# Patient Record
Sex: MALE | Race: WHITE | NOT HISPANIC OR LATINO | Employment: OTHER
[De-identification: names, ages, dates, MRNs, and addresses within clinical notes are randomized per-mention and may not be internally consistent; named-entity substitution may affect disease eponyms.]

---

## 2024-06-17 ENCOUNTER — TRANSCRIBE ORDERS (OUTPATIENT)
Dept: OTHER | Age: 74
End: 2024-06-17

## 2024-06-17 DIAGNOSIS — R10.13 EPIGASTRIC PAIN: ICD-10-CM

## 2024-06-17 DIAGNOSIS — Z71.3 DIETARY COUNSELING AND SURVEILLANCE: ICD-10-CM

## 2024-06-17 DIAGNOSIS — K21.9 GASTROESOPHAGEAL REFLUX DISEASE WITHOUT ESOPHAGITIS: Primary | ICD-10-CM

## 2024-06-17 DIAGNOSIS — R13.10 DYSPHAGIA, UNSPECIFIED TYPE: ICD-10-CM

## 2024-06-17 DIAGNOSIS — I10 ESSENTIAL (PRIMARY) HYPERTENSION: ICD-10-CM

## 2024-06-18 ENCOUNTER — HOSPITAL ENCOUNTER (OUTPATIENT)
Dept: SPEECH THERAPY | Facility: CLINIC | Age: 74
Discharge: HOME | End: 2024-06-18
Attending: INTERNAL MEDICINE
Payer: MEDICARE

## 2024-06-18 ENCOUNTER — HOSPITAL ENCOUNTER (OUTPATIENT)
Dept: GENERAL RADIOLOGY | Facility: CLINIC | Age: 74
Discharge: HOME | End: 2024-06-18
Attending: INTERNAL MEDICINE
Payer: MEDICARE

## 2024-06-18 DIAGNOSIS — R13.10 DYSPHAGIA, UNSPECIFIED TYPE: ICD-10-CM

## 2024-06-18 DIAGNOSIS — R10.13 EPIGASTRIC PAIN: ICD-10-CM

## 2024-06-18 DIAGNOSIS — Z71.3 DIETARY COUNSELING AND SURVEILLANCE: ICD-10-CM

## 2024-06-18 DIAGNOSIS — K21.9 GASTROESOPHAGEAL REFLUX DISEASE WITHOUT ESOPHAGITIS: ICD-10-CM

## 2024-06-18 DIAGNOSIS — I10 ESSENTIAL (PRIMARY) HYPERTENSION: ICD-10-CM

## 2024-06-18 DIAGNOSIS — I10 PRIMARY HYPERTENSION: ICD-10-CM

## 2024-06-18 PROCEDURE — 92611 MOTION FLUOROSCOPY/SWALLOW: CPT | Mod: GN | Performed by: SPEECH-LANGUAGE PATHOLOGIST

## 2024-06-18 PROCEDURE — 74230 X-RAY XM SWLNG FUNCJ C+: CPT

## 2024-06-18 RX ORDER — BARIUM SULFATE 400 MG/ML
SUSPENSION ORAL ONCE
Status: COMPLETED | OUTPATIENT
Start: 2024-06-18 | End: 2024-06-18

## 2024-06-18 RX ADMIN — BARIUM SULFATE 10 ML: 400 SUSPENSION ORAL at 14:09

## 2024-06-18 NOTE — PROGRESS NOTES
"    SPEECH LANGUAGE PATHOLOGY VIDEOFLUOROSCOPIC SWALLOW STUDY (VFSS) REPORT      Subjective   Presenting condition or subjective complaint:  Pt reports an ongoing hx of difficulty swallowing for approximately 7-8 years.  Pt's symptoms have included heartburn, globus sensation, coughing on water going down the \"wrong throat\", and coughing on food such as crackers at times with material coming back up his nose.  Pt has a hx of GERD, hiatal hernia, and esophageal dilation.  He recently has had increased hoarse voicing and phlegm over the past 6 weeks.  His esophageal symptoms have increased over the past few months.  No hx of lung congestion/pneumonia.  Date of onset:   6/18/24  Prior diagnostic imaging/testing results:     EGD recently with no need for dilation.  EGD approximately 1 year ago with dilation of the esophagus completed per pt report.  An esophagram is planned later this week.      Objective   Current Diet/Method of Nutritional Intake: thin liquids (level 0), regular diet      VIDEOFLUOROSCOPIC SWALLOW STUDY  Radiologist: Dr. Leone  Views Taken: left lateral AP not completed given no significant pharyngeal residue at the end of today's study.  Physical location of procedure: [facility]    VFSS textures trialed:   VFSS Eval: Thin Liquids  Mode of Presentation: cup, straw   Order of Presentation: 1, 2, 3, 4  Preparatory Phase: WFL  Oral Phase: residue in oral cavity  Bolus Location When Swallow Initiated: posterior angle of ramus  Pharyngeal Phase: impaired hyolaryngel excursion, impaired epiglottic movement, impaired pharyngoesophageal segment opening  Rosenbeck's Penetration Aspiration Scale: 1 - no aspiration, contrast does not enter airway  Diagnostic Statement:  Decreased complete epiglottic closure/inversion, decreased pharyngeal space for inversion, suspect osteophytes may be impacting pharyngeal space and epiglottic movement, no confirmed penetration, trace residue at times, tight UES/slight bump at UES, " bolus was noted behind the soft palate at times, reflux of pharyngeal residue up and behind soft palate during trial 4 by straw with consecutive swallows, residue did not reach the nasal cavity    VFSS Eval: Mildly Thick Liquids  Mode of Presentation: cup   Order of Presentation: 5  Preparatory Phase: WFL  Oral Phase: residue in oral cavity - trace  Bolus Location When Swallow Initiated: posterior angle of ramus  Pharyngeal Phase: impaired hyolaryngel excursion, impaired epiglottic movement, impaired pharyngoesophageal segment opening  Rosenbeck's Penetration Aspiration Scale: 1 - no aspiration, contrast does not enter airway  Diagnostic Statement: Decreased complete epiglottic closure/inversion, decreased pharyngeal space for inversion, suspect osteophytes may be impacting pharyngeal space and epiglottic movement, trace- no residue at times, tight UES/slight bump at UES    VFSS Eval: Purees  Mode of Presentation: spoon   Order of Presentation: 6  Preparatory Phase: WFL  Oral Phase: residue in oral cavity - trace  Bolus Location When Swallow Initiated: posterior angle of ramus  Pharyngeal Phase: impaired hyolaryngel excursion, impaired epiglottic movement, impaired pharyngoesophageal segment opening  Rosenbeck's Penetration Aspiration Scale: 1 - no aspiration, contrast does not enter airway  Diagnostic Statement: Decreased complete epiglottic closure/inversion, decreased pharyngeal space for inversion, suspect osteophytes may be impacting pharyngeal space and epiglottic movement, tight UES/slight bump at UES    VFSS Eval: Soft & Bite Sized  Mode of Presentation: spoon   Order of Presentation: 7  Preparatory Phase: poor bolus control  Oral Phase: residue in oral cavity, premature pharyngeal entry, trace oral residue  Bolus Location When Swallow Initiated: posterior angle of ramus  Pharyngeal Phase: impaired hyolaryngel excursion, impaired epiglottic movement, impaired pharyngoesophageal segment opening  Rosenbeck s  Penetration Aspiration Scale: 1 - no aspiration, contrast does not enter airway  Diagnostic Statement: Decreased complete epiglottic closure/inversion, decreased pharyngeal space for inversion, suspect osteophytes may be impacting pharyngeal space and epiglottic movement, tight UES/slight bump at UES    VFSS Eval: Solids  Mode of Presentation: spoon   Order of Presentation: 8  Preparatory Phase: WFL  Oral Phase: residue in oral cavity - trace  Bolus Location When Swallow Initiated: posterior angle of ramus  Pharyngeal Phase: impaired hyolaryngel excursion, impaired epiglottic movement, impaired pharyngoesophageal segment opening  Rosenbeck's Penetration Aspiration Scale: 1 - no aspiration, contrast does not enter airway  Diagnostic Statement: Decreased complete epiglottic closure/inversion, decreased pharyngeal space for inversion, suspect osteophytes may be impacting pharyngeal space and epiglottic movement, tight UES/slight bump at UES, trace residue    VFSS Eval: Barium Tablet  Mode of Presentation: self-fed, tablet with thin liquids  Order of Presentation: 9  Preparatory Phase: WFL  Oral Phase: WFL  Bolus Location When Swallow Initiated: posterior angle of ramus  Pharyngeal Phase: impaired hyolaryngel excursion, impaired epiglottic movement, impaired pharyngoesophageal segment opening  Rosenbeck s Penetration Aspiration Scale: 1 - no aspiration, contrast does not enter airway of barium tablet; coughing on water noted during trial  Diagnostic Statement: Decreased complete epiglottic closure/inversion, decreased pharyngeal space for inversion, suspect osteophytes may be impacting pharyngeal space and epiglottic movement, tight UES/slight bump at UES; coughing on water noted during trial    ESOPHAGEAL PHASE OF SWALLOW  See pt hx;   Tight UES/slight bump at UES noted during viewing    SWALLOW ASSESSMENT CLINICAL IMPRESSIONS AND RATIONALE  Diet Consistency Recommendations: thin liquids (level 0), regular diet  - add  moisture, pick soft food as needed  Recommended Feeding/Eating Techniques:  sit at 90 degrees, stay up for 30+ minutes after eating, small bites/sips , alternate textures  Medication Administration Recommendations: Meds with small sips      Assessment & Plan   CLINICAL IMPRESSIONS   Medical Diagnosis:    Dysphagia  Treatment Diagnosis:  Mild oral-pharyngeal dysphagia   Impression/Assessment: Pt presents with mild oral-pharyngeal dysphagia per today's VFSS.  Findings include decreased complete epiglottic closure/inversion, decreased pharyngeal space for inversion, suspect osteophytes may be impacting pharyngeal space and epiglottic movement, and a tight UES/slight bump at UES.  Deficits resulted in thin liquid bolus behind the soft palate at times, reflux of thin liquid pharyngeal residue up and behind soft palate during consecutive swallows by straw, and trace pharyngeal residue at times.  No confirmed penetration; however, bolus noted under the epiglottis during thin liquid trials.  Pt also coughed on water used to clear barium tablet.  Suspect pt may penetrate/aspirate thin liquids at times if mixed consistencies are present and/or pt takes large quantities.  Recommend a continued regular diet and thin liquids with use of safe swallow strategies.  No swallow Tx is indicated at this time.  Recommend pt proceed with an esophagram to further assess esophageal phase function.  Consider an ENT follow up to assess pharyngeal and laryngeal structures given today's findings and pt's complaint of increased hoarse voicing.  Will defer to referring MD/primary MD's for orders as felt indicated.  Consider a repeat VFSS in the future if increased aspiration signs are noted with po intake and/or respiratory status declines.      Education Assessment: No barriers to learning were identified.  Pt verbalized understanding of all education provided regarding today's results and recommendations.     Risks and benefits of  evaluation/treatment have been explained.       Evaluation Time: 30 minutes       Signing Clinician: IVIS Baeza      [facility]                                                                                   OUTPATIENT SPEECH LANGUAGE PATHOLOGY

## 2024-08-11 ENCOUNTER — HEALTH MAINTENANCE LETTER (OUTPATIENT)
Age: 74
End: 2024-08-11

## 2025-08-17 ENCOUNTER — HEALTH MAINTENANCE LETTER (OUTPATIENT)
Age: 75
End: 2025-08-17